# Patient Record
Sex: FEMALE | Race: WHITE | NOT HISPANIC OR LATINO | Employment: OTHER | URBAN - METROPOLITAN AREA
[De-identification: names, ages, dates, MRNs, and addresses within clinical notes are randomized per-mention and may not be internally consistent; named-entity substitution may affect disease eponyms.]

---

## 2020-06-09 ENCOUNTER — OFFICE VISIT (OUTPATIENT)
Dept: NEUROSURGERY | Facility: CLINIC | Age: 85
End: 2020-06-09

## 2020-06-09 DIAGNOSIS — M79.5 FOREIGN BODY (FB) IN SOFT TISSUE: Primary | ICD-10-CM

## 2020-06-09 PROBLEM — N30.01 ACUTE CYSTITIS WITH HEMATURIA: Status: ACTIVE | Noted: 2020-06-09

## 2020-06-09 PROBLEM — R41.0 CONFUSION: Status: ACTIVE | Noted: 2020-06-09

## 2020-06-09 PROCEDURE — 99442 PR PHYS/QHP TELEPHONE EVALUATION 11-20 MIN: CPT | Performed by: PHYSICIAN ASSISTANT

## 2020-06-09 RX ORDER — LANSOPRAZOLE 30 MG/1
CAPSULE, DELAYED RELEASE ORAL
COMMUNITY
Start: 2020-05-22 | End: 2021-06-03

## 2020-06-09 RX ORDER — HYDROCHLOROTHIAZIDE 12.5 MG/1
12.5 TABLET ORAL
COMMUNITY
Start: 2016-09-15 | End: 2021-06-03

## 2020-06-09 RX ORDER — AMLODIPINE BESYLATE 5 MG/1
TABLET ORAL
COMMUNITY
Start: 2020-05-21 | End: 2021-06-03

## 2020-06-09 RX ORDER — FERROUS SULFATE TAB EC 324 MG (65 MG FE EQUIVALENT) 324 (65 FE) MG
324 TABLET DELAYED RESPONSE ORAL
COMMUNITY
Start: 2016-11-02 | End: 2021-06-03

## 2020-06-09 RX ORDER — DONEPEZIL HYDROCHLORIDE 10 MG/1
TABLET, FILM COATED ORAL
COMMUNITY
Start: 2020-05-29 | End: 2021-06-03

## 2020-06-09 RX ORDER — NYSTATIN 100000 [USP'U]/G
POWDER TOPICAL
COMMUNITY
Start: 2020-04-18 | End: 2021-06-03

## 2020-06-09 RX ORDER — ATENOLOL 25 MG/1
TABLET ORAL
COMMUNITY
Start: 2020-05-20 | End: 2021-06-03

## 2020-06-09 RX ORDER — FUROSEMIDE 20 MG/1
TABLET ORAL
COMMUNITY
Start: 2020-06-02 | End: 2021-06-03

## 2020-06-09 RX ORDER — MIRTAZAPINE 15 MG/1
TABLET, FILM COATED ORAL
COMMUNITY
Start: 2020-05-30

## 2020-06-09 RX ORDER — LISINOPRIL 40 MG/1
TABLET ORAL
COMMUNITY
Start: 2020-06-04 | End: 2021-06-11

## 2020-06-09 RX ORDER — GABAPENTIN 100 MG/1
100 CAPSULE ORAL
COMMUNITY
End: 2021-06-03

## 2020-06-09 RX ORDER — ATORVASTATIN CALCIUM 10 MG/1
TABLET, FILM COATED ORAL
COMMUNITY
Start: 2020-05-30 | End: 2021-06-03

## 2020-06-09 RX ORDER — TRAZODONE HYDROCHLORIDE 50 MG/1
TABLET ORAL
COMMUNITY
Start: 2020-05-16

## 2020-06-09 RX ORDER — CLONAZEPAM 0.5 MG/1
0.5 TABLET ORAL
COMMUNITY
Start: 2016-09-15 | End: 2021-06-03

## 2020-06-09 RX ORDER — ASPIRIN 81 MG/1
81 TABLET ORAL
COMMUNITY
End: 2021-06-03

## 2020-06-09 RX ORDER — FAMOTIDINE 40 MG/1
TABLET, FILM COATED ORAL
COMMUNITY
Start: 2020-04-03 | End: 2021-06-03

## 2020-06-09 RX ORDER — CITALOPRAM 20 MG/1
TABLET ORAL
COMMUNITY
Start: 2020-06-04 | End: 2021-06-03

## 2020-06-09 RX ORDER — LEVOTHYROXINE SODIUM 0.12 MG/1
TABLET ORAL
COMMUNITY
Start: 2020-05-23 | End: 2021-06-03

## 2020-06-09 NOTE — PROGRESS NOTES
Office F/U Visit  Subjective   Patient ID: Ibrahima Pinto is a 86 y.o. female  [id number]     You have chosen to receive care through a telephone visit. Do you consent to use a telephone visit for your medical care today? Yes  I am talking today with the nursing provider and patient.    CC: Scab on scalp, can see screw    History of Present Illness this is a very pleasant 86-year-old female in [facility] who has developed a scab on her scalp and nursing has been able to view a small screw that has penetrated through the skin.  There are 2 other areas with discoloration from the underlying screw.  Apparently the patient is in good health according to the nursing staff, she is recently been treated for H. pylori otherwise no major medical issues.    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  Macario Oliva and [facility]  Subsequent  shunting for hydrocephalus, 10/3/2003,  Dr. Oliva.  Dementia-on Aricept.  HTN  Hypothyroid    No allergies      Current Outpatient Medications:   •  clonazePAM (KlonoPIN) 0.5 MG tablet, Take 0.5 mg by mouth., Disp: , Rfl:   •  ferrous sulfate 324 (65 Fe) MG tablet delayed-release EC tablet, Take 324 mg by mouth., Disp: , Rfl:   •  hydroCHLOROthiazide (HYDRODIURIL) 12.5 MG tablet, Take 12.5 mg by mouth., Disp: , Rfl:   •  amLODIPine (NORVASC) 5 MG tablet, , Disp: , Rfl:   •  aspirin 81 MG EC tablet, Take 81 mg by mouth., Disp: , Rfl:   •  atenolol (TENORMIN) 25 MG tablet, , Disp: , Rfl:   •  atorvastatin (LIPITOR) 10 MG tablet, , Disp: , Rfl:   •  citalopram (CeleXA) 20 MG tablet, , Disp: , Rfl:   •  donepezil (ARICEPT) 10 MG tablet, , Disp: , Rfl:   •  famotidine (PEPCID) 40 MG tablet, , Disp: , Rfl:   •  furosemide (LASIX) 20 MG tablet, , Disp: , Rfl:   •  gabapentin (NEURONTIN) 100 MG capsule, Take 100 mg by mouth., Disp: , Rfl:   •   lansoprazole (PREVACID) 30 MG capsule, , Disp: , Rfl:   •  levothyroxine (SYNTHROID, LEVOTHROID) 125 MCG tablet, , Disp: , Rfl:   •  lisinopril (PRINIVIL,ZESTRIL) 40 MG tablet, , Disp: , Rfl:   •  mirtazapine (REMERON) 15 MG tablet, , Disp: , Rfl:   •  NYSTATIN [id number] UNIT/GM powder, , Disp: , Rfl:   •  traZODone (DESYREL) 50 MG tablet, , Disp: , Rfl:     Social History     Tobacco Use   • Smoking status: Not on file   Substance Use Topics   • Alcohol use: Not on file   • Drug use: Not on file     I have reviewed and confirmed the accuracy of the ROS as documented by the MA/DEDRICKN/RN Yanelis Mancilla PA-C    PE:  Patient was on speaker phone with the nurse, she was alert and answered appropriately.    Neurologic Exam   Per nursing she is ambulatory, eating and taking po.    Independent Review of Radiographic Studies:   Head CT will be mailed to my office for review. Pictures of patient's scalp were sent to my phone which show an area of scabbing and another dark area with noticeable screw from the titanium plate.     Medical Decision Makin. Craniotomy fixation screw penetrating scalp. Had head CT which will be mailed to office for review. May need to have these surgically removed. I will call nursing home after review of CT.   2. Recent treatment for H. Pylori.    Patient's There is no height or weight on file to calculate BMI. BMI is within normal parameters. No follow-up required..  Ibrahima Pinto  has no tobacco history on file. she is a non-smoker.    Telephone visit 20 min.     KATHLEEN Bartholomew

## 2020-06-09 NOTE — PROGRESS NOTES
You have chosen to receive care through a telephone visit. Do you consent to use a telephone visit for your medical care today? YES      Office F/U Visit  Subjective   Patient ID: Ibrahima Pinto is a 86 y.o. female  [id number]    CC: Screw from craniotomy penetrating through scalp    History of Present Illness patient had craniotomy in 2003 now has a fixation screw that is backing out and penetrating through the scalp.    History reviewed. No pertinent past medical history.    No Known Allergies    Current Outpatient Medications:   •  clonazePAM (KlonoPIN) 0.5 MG tablet, Take 0.5 mg by mouth., Disp: , Rfl:   •  ferrous sulfate 324 (65 Fe) MG tablet delayed-release EC tablet, Take 324 mg by mouth., Disp: , Rfl:   •  hydroCHLOROthiazide (HYDRODIURIL) 12.5 MG tablet, Take 12.5 mg by mouth., Disp: , Rfl:   •  amLODIPine (NORVASC) 5 MG tablet, , Disp: , Rfl:   •  aspirin 81 MG EC tablet, Take 81 mg by mouth., Disp: , Rfl:   •  atenolol (TENORMIN) 25 MG tablet, , Disp: , Rfl:   •  atorvastatin (LIPITOR) 10 MG tablet, , Disp: , Rfl:   •  citalopram (CeleXA) 20 MG tablet, , Disp: , Rfl:   •  donepezil (ARICEPT) 10 MG tablet, , Disp: , Rfl:   •  famotidine (PEPCID) 40 MG tablet, , Disp: , Rfl:   •  furosemide (LASIX) 20 MG tablet, , Disp: , Rfl:   •  gabapentin (NEURONTIN) 100 MG capsule, Take 100 mg by mouth., Disp: , Rfl:   •  lansoprazole (PREVACID) 30 MG capsule, , Disp: , Rfl:   •  levothyroxine (SYNTHROID, LEVOTHROID) 125 MCG tablet, , Disp: , Rfl:   •  lisinopril (PRINIVIL,ZESTRIL) 40 MG tablet, , Disp: , Rfl:   •  mirtazapine (REMERON) 15 MG tablet, , Disp: , Rfl:   •  NYSTATIN [id number] UNIT/GM powder, , Disp: , Rfl:   •  traZODone (DESYREL) 50 MG tablet, , Disp: , Rfl:   Social History     Tobacco Use   • Smoking status: Never Smoker   • Smokeless tobacco: Never Used   Substance Use Topics   • Alcohol use: Never     Frequency: Never   • Drug use: Never     Review of Systems   Constitutional: Negative for activity  change, appetite change, chills, diaphoresis, fatigue, fever and unexpected weight change.   HENT: Negative for congestion, dental problem, drooling, ear discharge, ear pain, facial swelling, hearing loss, mouth sores, nosebleeds, postnasal drip, rhinorrhea, sinus pressure, sneezing, sore throat, tinnitus, trouble swallowing and voice change.    Eyes: Negative for photophobia, pain, discharge, redness, itching and visual disturbance.   Respiratory: Negative for apnea, cough, choking, chest tightness, shortness of breath, wheezing and stridor.    Cardiovascular: Negative for chest pain, palpitations and leg swelling.   Gastrointestinal: Negative for abdominal distention, abdominal pain, anal bleeding, blood in stool, constipation, diarrhea, nausea, rectal pain and vomiting.   Endocrine: Negative for cold intolerance, heat intolerance, polydipsia, polyphagia and polyuria.   Genitourinary: Negative for decreased urine volume, difficulty urinating, dysuria, enuresis, flank pain, frequency, genital sores, hematuria and urgency.   Musculoskeletal: Negative for arthralgias, back pain, gait problem, joint swelling, myalgias, neck pain and neck stiffness.   Skin: Negative for color change, pallor, rash and wound.   Allergic/Immunologic: Negative for environmental allergies, food allergies and immunocompromised state.   Neurological: Negative for dizziness, tremors, seizures, syncope, facial asymmetry, speech difficulty, weakness, light-headedness, numbness and headaches.   Hematological: Negative for adenopathy. Does not bruise/bleed easily.   Psychiatric/Behavioral: Negative for agitation, behavioral problems, confusion, decreased concentration, dysphoric mood, hallucinations, self-injury, sleep disturbance and suicidal ideas. The patient is not nervous/anxious and is not hyperactive.    All other systems reviewed and are negative.       Physical Exam  There were no vitals taken for this visit.    PE:    Neurologic Exam    The patient and nurse were on speaker phone and the patient is alert and conversant.    Independent Review of Radiographic Studies:   I have looked at pictures sent from the nursing staff.    Medical Decision Makin.  Foreign body-cranial fixation screw penetrating through the scalp.  There is a scab around the screw.  On picture there appears to be 2 other screws that have caused discoloration in the skin.  2.  A head CT scan has been obtained and her local area and the nursing staff will mail that to my office for review.  I did discuss with the nurse that probably ultimately we will need to remove the screw and we will try to make further arrangements after we have reviewed the CT of the head.    within normal parameters. No follow-up required..       Ibrahima Pinto  reports that she has never smoked. She has never used smokeless tobacco.      KATHLEEN Bartholomew    2020    We have reviewed the head CT, screw noted that has backed out, no evidence of bony infection.     We will make arrangements to have the patient come for planned OP surgical procedure to removed one or more cranial fixation screws.    Yanelis Mancilla PA-C

## 2020-06-11 ENCOUNTER — PREP FOR SURGERY (OUTPATIENT)
Dept: OTHER | Facility: HOSPITAL | Age: 85
End: 2020-06-11

## 2020-06-11 DIAGNOSIS — M79.5 FOREIGN BODY (FB) IN SOFT TISSUE: Primary | ICD-10-CM

## 2020-06-15 RX ORDER — SODIUM CHLORIDE 0.9 % (FLUSH) 0.9 %
10 SYRINGE (ML) INJECTION AS NEEDED
Status: CANCELLED | OUTPATIENT
Start: 2020-06-15

## 2020-06-15 RX ORDER — CEFAZOLIN SODIUM 2 G/100ML
2 INJECTION, SOLUTION INTRAVENOUS ONCE
Status: CANCELLED | OUTPATIENT
Start: 2020-06-15 | End: 2020-06-15

## 2020-06-15 RX ORDER — SODIUM CHLORIDE, SODIUM LACTATE, POTASSIUM CHLORIDE, CALCIUM CHLORIDE 600; 310; 30; 20 MG/100ML; MG/100ML; MG/100ML; MG/100ML
100 INJECTION, SOLUTION INTRAVENOUS CONTINUOUS
Status: CANCELLED | OUTPATIENT
Start: 2020-06-15

## 2020-06-15 RX ORDER — SODIUM CHLORIDE 0.9 % (FLUSH) 0.9 %
3 SYRINGE (ML) INJECTION EVERY 12 HOURS SCHEDULED
Status: CANCELLED | OUTPATIENT
Start: 2020-06-15

## 2020-06-15 NOTE — H&P
Patient ID: Ibrahima Pinto is a 86 y.o. female  [id number]      You have chosen to receive care through a telephone visit. Do you consent to use a telephone visit for your medical care today? Yes  I am talking today with the nursing provider and patient.     CC: Scab on scalp, can see screw     History of Present Illness this is a very pleasant 86-year-old female in [facility] who has developed a scab on her scalp and nursing has been able to view a small screw that has penetrated through the skin.  There are 2 other areas with discoloration from the underlying screw.  Apparently the patient is in good health according to the nursing staff, she is recently been treated for H. pylori otherwise no major medical issues.     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  Macario Oliva and [facility]oy  Subsequent  shunting for hydrocephalus, 10/3/2003,  Dr. Oliva.  Dementia-on Aricept.  HTN  Hypothyroid     No allergies        Current Outpatient Medications:   •  clonazePAM (KlonoPIN) 0.5 MG tablet, Take 0.5 mg by mouth., Disp: , Rfl:   •  ferrous sulfate 324 (65 Fe) MG tablet delayed-release EC tablet, Take 324 mg by mouth., Disp: , Rfl:   •  hydroCHLOROthiazide (HYDRODIURIL) 12.5 MG tablet, Take 12.5 mg by mouth., Disp: , Rfl:   •  amLODIPine (NORVASC) 5 MG tablet, , Disp: , Rfl:   •  aspirin 81 MG EC tablet, Take 81 mg by mouth., Disp: , Rfl:   •  atenolol (TENORMIN) 25 MG tablet, , Disp: , Rfl:   •  atorvastatin (LIPITOR) 10 MG tablet, , Disp: , Rfl:   •  citalopram (CeleXA) 20 MG tablet, , Disp: , Rfl:   •  donepezil (ARICEPT) 10 MG tablet, , Disp: , Rfl:   •  famotidine (PEPCID) 40 MG tablet, , Disp: , Rfl:   •  furosemide (LASIX) 20 MG tablet, , Disp: , Rfl:   •  gabapentin (NEURONTIN) 100 MG capsule, Take 100 mg by mouth., Disp: , Rfl:   •  lansoprazole (PREVACID) 30 MG  capsule, , Disp: , Rfl:   •  levothyroxine (SYNTHROID, LEVOTHROID) 125 MCG tablet, , Disp: , Rfl:   •  lisinopril (PRINIVIL,ZESTRIL) 40 MG tablet, , Disp: , Rfl:   •  mirtazapine (REMERON) 15 MG tablet, , Disp: , Rfl:   •  NYSTATIN [id number] UNIT/GM powder, , Disp: , Rfl:   •  traZODone (DESYREL) 50 MG tablet, , Disp: , Rfl:      Social History           Tobacco Use   • Smoking status: Not on file   Substance Use Topics   • Alcohol use: Not on file   • Drug use: Not on file      I have reviewed and confirmed the accuracy of the ROS as documented by the MA/LPN/RN Yanelis Mancilla PA-C     PE:  Patient was on speaker phone with the nurse, she was alert and answered appropriately.     Neurologic Exam   Per nursing she is ambulatory, eating and taking po.     Independent Review of Radiographic Studies:   Head CT will be mailed to my office for review. Pictures of patient's scalp were sent to my phone which show an area of scabbing and another dark area with noticeable screw from the titanium plate.      Medical Decision Makin. Craniotomy fixation screw penetrating scalp. Had head CT which will be mailed to office for review. May need to have these surgically removed. I will call nursing home after review of CT.   2. Recent treatment for H. Pylori.     Patient's There is no height or weight on file to calculate BMI. BMI is within normal parameters. No follow-up required..  Ibrahima Pinto  has no tobacco history on file. she is a non-smoker.     Nicolasa Mancilla, PAC

## 2020-06-16 DIAGNOSIS — M79.5 FOREIGN BODY (FB) IN SOFT TISSUE: Primary | ICD-10-CM

## 2020-06-17 DIAGNOSIS — Z00.6 EXAMINATION FOR NORMAL COMPARISON FOR CLINICAL RESEARCH: Primary | ICD-10-CM

## 2020-06-28 ENCOUNTER — APPOINTMENT (OUTPATIENT)
Dept: PREADMISSION TESTING | Facility: HOSPITAL | Age: 85
End: 2020-06-28

## 2020-06-30 ENCOUNTER — ANESTHESIA EVENT (OUTPATIENT)
Dept: PERIOP | Facility: HOSPITAL | Age: 85
End: 2020-06-30

## 2020-06-30 RX ORDER — SODIUM CHLORIDE 0.9 % (FLUSH) 0.9 %
10 SYRINGE (ML) INJECTION AS NEEDED
Status: CANCELLED | OUTPATIENT
Start: 2020-06-30

## 2020-06-30 RX ORDER — SODIUM CHLORIDE 0.9 % (FLUSH) 0.9 %
10 SYRINGE (ML) INJECTION EVERY 12 HOURS SCHEDULED
Status: CANCELLED | OUTPATIENT
Start: 2020-06-30

## 2020-06-30 RX ORDER — FAMOTIDINE 10 MG/ML
20 INJECTION, SOLUTION INTRAVENOUS ONCE
Status: CANCELLED | OUTPATIENT
Start: 2020-06-30 | End: 2020-06-30

## 2020-07-01 ENCOUNTER — HOSPITAL ENCOUNTER (OUTPATIENT)
Facility: HOSPITAL | Age: 85
Setting detail: HOSPITAL OUTPATIENT SURGERY
Discharge: HOME | End: 2020-07-01
Attending: NEUROLOGICAL SURGERY | Admitting: NEUROLOGICAL SURGERY

## 2020-07-01 ENCOUNTER — ANESTHESIA (OUTPATIENT)
Dept: PERIOP | Facility: HOSPITAL | Age: 85
End: 2020-07-01

## 2020-07-01 VITALS
TEMPERATURE: 97.2 F | DIASTOLIC BLOOD PRESSURE: 52 MMHG | HEIGHT: 64 IN | WEIGHT: 158 LBS | RESPIRATION RATE: 14 BRPM | SYSTOLIC BLOOD PRESSURE: 116 MMHG | OXYGEN SATURATION: 95 % | BODY MASS INDEX: 26.98 KG/M2 | HEART RATE: 59 BPM

## 2020-07-01 PROBLEM — T81.32XD: Status: ACTIVE | Noted: 2020-07-01

## 2020-07-01 LAB
ANION GAP SERPL CALCULATED.3IONS-SCNC: 13 MMOL/L (ref 5–15)
BASOPHILS # BLD AUTO: 0.08 10*3/MM3 (ref 0–0.2)
BASOPHILS NFR BLD AUTO: 0.7 % (ref 0–1.5)
BUN SERPL-MCNC: 49 MG/DL (ref 8–23)
BUN/CREAT SERPL: 22.1 (ref 7–25)
CALCIUM SPEC-SCNC: 9.5 MG/DL (ref 8.6–10.5)
CHLORIDE SERPL-SCNC: 108 MMOL/L (ref 98–107)
CO2 SERPL-SCNC: 24 MMOL/L (ref 22–29)
CREAT SERPL-MCNC: 2.22 MG/DL (ref 0.57–1)
DEPRECATED RDW RBC AUTO: 47.7 FL (ref 37–54)
EOSINOPHIL # BLD AUTO: 0.33 10*3/MM3 (ref 0–0.4)
EOSINOPHIL NFR BLD AUTO: 3 % (ref 0.3–6.2)
ERYTHROCYTE [DISTWIDTH] IN BLOOD BY AUTOMATED COUNT: 13.4 % (ref 12.3–15.4)
GFR SERPL CREATININE-BSD FRML MDRD: 21 ML/MIN/1.73
GLUCOSE SERPL-MCNC: 117 MG/DL (ref 65–99)
HCT VFR BLD AUTO: 38.3 % (ref 34–46.6)
HGB BLD-MCNC: 12 G/DL (ref 12–15.9)
IMM GRANULOCYTES # BLD AUTO: 0.06 10*3/MM3 (ref 0–0.05)
IMM GRANULOCYTES NFR BLD AUTO: 0.5 % (ref 0–0.5)
LYMPHOCYTES # BLD AUTO: 3.23 10*3/MM3 (ref 0.7–3.1)
LYMPHOCYTES NFR BLD AUTO: 29.3 % (ref 19.6–45.3)
MCH RBC QN AUTO: 30.2 PG (ref 26.6–33)
MCHC RBC AUTO-ENTMCNC: 31.3 G/DL (ref 31.5–35.7)
MCV RBC AUTO: 96.5 FL (ref 79–97)
MONOCYTES # BLD AUTO: 0.77 10*3/MM3 (ref 0.1–0.9)
MONOCYTES NFR BLD AUTO: 7 % (ref 5–12)
NEUTROPHILS NFR BLD AUTO: 59.5 % (ref 42.7–76)
NEUTROPHILS NFR BLD AUTO: 6.54 10*3/MM3 (ref 1.7–7)
NRBC BLD AUTO-RTO: 0 /100 WBC (ref 0–0.2)
PLATELET # BLD AUTO: 415 10*3/MM3 (ref 140–450)
PMV BLD AUTO: 10.1 FL (ref 6–12)
POTASSIUM SERPL-SCNC: 4.8 MMOL/L (ref 3.5–5.2)
RBC # BLD AUTO: 3.97 10*6/MM3 (ref 3.77–5.28)
SARS-COV-2 RNA RESP QL NAA+PROBE: NOT DETECTED
SODIUM SERPL-SCNC: 145 MMOL/L (ref 136–145)
WBC # BLD AUTO: 11.01 10*3/MM3 (ref 3.4–10.8)

## 2020-07-01 PROCEDURE — 85025 COMPLETE CBC W/AUTO DIFF WBC: CPT | Performed by: ANESTHESIOLOGY

## 2020-07-01 PROCEDURE — S0260 H&P FOR SURGERY: HCPCS | Performed by: PHYSICIAN ASSISTANT

## 2020-07-01 PROCEDURE — 25010000002 ONDANSETRON PER 1 MG: Performed by: NURSE ANESTHETIST, CERTIFIED REGISTERED

## 2020-07-01 PROCEDURE — 25010000003 CEFAZOLIN IN DEXTROSE 2-4 GM/100ML-% SOLUTION: Performed by: PHYSICIAN ASSISTANT

## 2020-07-01 PROCEDURE — 25010000002 FENTANYL CITRATE (PF) 100 MCG/2ML SOLUTION: Performed by: NURSE ANESTHETIST, CERTIFIED REGISTERED

## 2020-07-01 PROCEDURE — G0378 HOSPITAL OBSERVATION PER HR: HCPCS

## 2020-07-01 PROCEDURE — 25010000002 PROPOFOL 10 MG/ML EMULSION: Performed by: NURSE ANESTHETIST, CERTIFIED REGISTERED

## 2020-07-01 PROCEDURE — 93005 ELECTROCARDIOGRAM TRACING: CPT | Performed by: ANESTHESIOLOGY

## 2020-07-01 PROCEDURE — 80048 BASIC METABOLIC PNL TOTAL CA: CPT | Performed by: ANESTHESIOLOGY

## 2020-07-01 PROCEDURE — 20670 REMOVAL IMPLANT SUPERFICIAL: CPT | Performed by: NEUROLOGICAL SURGERY

## 2020-07-01 PROCEDURE — 25010000002 DEXAMETHASONE PER 1 MG: Performed by: NURSE ANESTHETIST, CERTIFIED REGISTERED

## 2020-07-01 PROCEDURE — 93010 ELECTROCARDIOGRAM REPORT: CPT | Performed by: INTERNAL MEDICINE

## 2020-07-01 PROCEDURE — C9803 HOPD COVID-19 SPEC COLLECT: HCPCS | Performed by: PHYSICIAN ASSISTANT

## 2020-07-01 PROCEDURE — 87635 SARS-COV-2 COVID-19 AMP PRB: CPT | Performed by: PHYSICIAN ASSISTANT

## 2020-07-01 RX ORDER — LIDOCAINE HYDROCHLORIDE 10 MG/ML
INJECTION, SOLUTION EPIDURAL; INFILTRATION; INTRACAUDAL; PERINEURAL AS NEEDED
Status: DISCONTINUED | OUTPATIENT
Start: 2020-07-01 | End: 2020-07-01 | Stop reason: SURG

## 2020-07-01 RX ORDER — ONDANSETRON 2 MG/ML
4 INJECTION INTRAMUSCULAR; INTRAVENOUS ONCE AS NEEDED
Status: DISCONTINUED | OUTPATIENT
Start: 2020-07-01 | End: 2020-07-01 | Stop reason: HOSPADM

## 2020-07-01 RX ORDER — PROMETHAZINE HYDROCHLORIDE 25 MG/1
25 SUPPOSITORY RECTAL ONCE AS NEEDED
Status: DISCONTINUED | OUTPATIENT
Start: 2020-07-01 | End: 2020-07-01 | Stop reason: HOSPADM

## 2020-07-01 RX ORDER — ONDANSETRON 2 MG/ML
INJECTION INTRAMUSCULAR; INTRAVENOUS AS NEEDED
Status: DISCONTINUED | OUTPATIENT
Start: 2020-07-01 | End: 2020-07-01 | Stop reason: SURG

## 2020-07-01 RX ORDER — PROMETHAZINE HYDROCHLORIDE 25 MG/1
25 TABLET ORAL ONCE AS NEEDED
Status: DISCONTINUED | OUTPATIENT
Start: 2020-07-01 | End: 2020-07-01 | Stop reason: HOSPADM

## 2020-07-01 RX ORDER — ACETAMINOPHEN 650 MG/1
650 SUPPOSITORY RECTAL ONCE AS NEEDED
Status: DISCONTINUED | OUTPATIENT
Start: 2020-07-01 | End: 2020-07-01 | Stop reason: HOSPADM

## 2020-07-01 RX ORDER — PROPOFOL 10 MG/ML
VIAL (ML) INTRAVENOUS AS NEEDED
Status: DISCONTINUED | OUTPATIENT
Start: 2020-07-01 | End: 2020-07-01 | Stop reason: SURG

## 2020-07-01 RX ORDER — IPRATROPIUM BROMIDE AND ALBUTEROL SULFATE 2.5; .5 MG/3ML; MG/3ML
3 SOLUTION RESPIRATORY (INHALATION) ONCE AS NEEDED
Status: DISCONTINUED | OUTPATIENT
Start: 2020-07-01 | End: 2020-07-01 | Stop reason: HOSPADM

## 2020-07-01 RX ORDER — DEXAMETHASONE SODIUM PHOSPHATE 4 MG/ML
INJECTION, SOLUTION INTRA-ARTICULAR; INTRALESIONAL; INTRAMUSCULAR; INTRAVENOUS; SOFT TISSUE AS NEEDED
Status: DISCONTINUED | OUTPATIENT
Start: 2020-07-01 | End: 2020-07-01 | Stop reason: SURG

## 2020-07-01 RX ORDER — LIDOCAINE HYDROCHLORIDE 10 MG/ML
0.5 INJECTION, SOLUTION EPIDURAL; INFILTRATION; INTRACAUDAL; PERINEURAL ONCE AS NEEDED
Status: COMPLETED | OUTPATIENT
Start: 2020-07-01 | End: 2020-07-01

## 2020-07-01 RX ORDER — FENTANYL CITRATE 50 UG/ML
50 INJECTION, SOLUTION INTRAMUSCULAR; INTRAVENOUS
Status: DISCONTINUED | OUTPATIENT
Start: 2020-07-01 | End: 2020-07-01 | Stop reason: HOSPADM

## 2020-07-01 RX ORDER — SODIUM CHLORIDE, SODIUM LACTATE, POTASSIUM CHLORIDE, CALCIUM CHLORIDE 600; 310; 30; 20 MG/100ML; MG/100ML; MG/100ML; MG/100ML
9 INJECTION, SOLUTION INTRAVENOUS CONTINUOUS
Status: DISCONTINUED | OUTPATIENT
Start: 2020-07-01 | End: 2020-07-01 | Stop reason: HOSPADM

## 2020-07-01 RX ORDER — CEFAZOLIN SODIUM 2 G/100ML
2 INJECTION, SOLUTION INTRAVENOUS ONCE
Status: COMPLETED | OUTPATIENT
Start: 2020-07-01 | End: 2020-07-01

## 2020-07-01 RX ORDER — MAGNESIUM HYDROXIDE 1200 MG/15ML
LIQUID ORAL AS NEEDED
Status: DISCONTINUED | OUTPATIENT
Start: 2020-07-01 | End: 2020-07-01 | Stop reason: HOSPADM

## 2020-07-01 RX ORDER — ACETAMINOPHEN 325 MG/1
650 TABLET ORAL ONCE AS NEEDED
Status: DISCONTINUED | OUTPATIENT
Start: 2020-07-01 | End: 2020-07-01 | Stop reason: HOSPADM

## 2020-07-01 RX ORDER — MEPERIDINE HYDROCHLORIDE 25 MG/ML
12.5 INJECTION INTRAMUSCULAR; INTRAVENOUS; SUBCUTANEOUS
Status: DISCONTINUED | OUTPATIENT
Start: 2020-07-01 | End: 2020-07-01 | Stop reason: HOSPADM

## 2020-07-01 RX ORDER — FAMOTIDINE 20 MG/1
20 TABLET, FILM COATED ORAL ONCE
Status: COMPLETED | OUTPATIENT
Start: 2020-07-01 | End: 2020-07-01

## 2020-07-01 RX ORDER — PROMETHAZINE HYDROCHLORIDE 25 MG/ML
6.25 INJECTION, SOLUTION INTRAMUSCULAR; INTRAVENOUS ONCE AS NEEDED
Status: DISCONTINUED | OUTPATIENT
Start: 2020-07-01 | End: 2020-07-01 | Stop reason: HOSPADM

## 2020-07-01 RX ORDER — FENTANYL CITRATE 50 UG/ML
INJECTION, SOLUTION INTRAMUSCULAR; INTRAVENOUS AS NEEDED
Status: DISCONTINUED | OUTPATIENT
Start: 2020-07-01 | End: 2020-07-01 | Stop reason: SURG

## 2020-07-01 RX ORDER — VECURONIUM BROMIDE 1 MG/ML
INJECTION, POWDER, LYOPHILIZED, FOR SOLUTION INTRAVENOUS AS NEEDED
Status: DISCONTINUED | OUTPATIENT
Start: 2020-07-01 | End: 2020-07-01 | Stop reason: SURG

## 2020-07-01 RX ADMIN — SUGAMMADEX 200 MG: 100 INJECTION, SOLUTION INTRAVENOUS at 11:09

## 2020-07-01 RX ADMIN — LIDOCAINE HYDROCHLORIDE 50 MG: 10 INJECTION, SOLUTION EPIDURAL; INFILTRATION; INTRACAUDAL; PERINEURAL at 10:47

## 2020-07-01 RX ADMIN — PROPOFOL 80 MG: 10 INJECTION, EMULSION INTRAVENOUS at 10:47

## 2020-07-01 RX ADMIN — LIDOCAINE HYDROCHLORIDE 0.2 ML: 10 INJECTION, SOLUTION EPIDURAL; INFILTRATION; INTRACAUDAL; PERINEURAL at 09:55

## 2020-07-01 RX ADMIN — FENTANYL CITRATE 50 MCG: 50 INJECTION, SOLUTION INTRAMUSCULAR; INTRAVENOUS at 10:41

## 2020-07-01 RX ADMIN — ONDANSETRON 4 MG: 2 INJECTION INTRAMUSCULAR; INTRAVENOUS at 11:02

## 2020-07-01 RX ADMIN — SODIUM CHLORIDE, POTASSIUM CHLORIDE, SODIUM LACTATE AND CALCIUM CHLORIDE 9 ML/HR: 600; 310; 30; 20 INJECTION, SOLUTION INTRAVENOUS at 09:55

## 2020-07-01 RX ADMIN — FAMOTIDINE 20 MG: 20 TABLET, FILM COATED ORAL at 10:00

## 2020-07-01 RX ADMIN — DEXAMETHASONE SODIUM PHOSPHATE 4 MG: 4 INJECTION, SOLUTION INTRAMUSCULAR; INTRAVENOUS at 10:55

## 2020-07-01 RX ADMIN — CEFAZOLIN SODIUM 2 G: 2 INJECTION, SOLUTION INTRAVENOUS at 10:41

## 2020-07-01 RX ADMIN — VECURONIUM BROMIDE 4 MG: 1 INJECTION, POWDER, LYOPHILIZED, FOR SOLUTION INTRAVENOUS at 10:47

## 2020-07-01 RX ADMIN — FENTANYL CITRATE 50 MCG: 50 INJECTION, SOLUTION INTRAMUSCULAR; INTRAVENOUS at 10:47

## 2020-07-01 NOTE — ANESTHESIA PROCEDURE NOTES
Airway  Urgency: elective    Date/Time: 7/1/2020 10:47 AM  End Time:7/1/2020 10:50 AM  Airway not difficult    General Information and Staff    Patient location during procedure: OR  CRNA: Edgardo Levy CRNA    Indications and Patient Condition  Indications for airway management: airway protection    Preoxygenated: yes  MILS not maintained throughout  Mask difficulty assessment: 1 - vent by mask    Final Airway Details  Final airway type: endotracheal airway      Successful airway: ETT  Cuffed: yes   Successful intubation technique: direct laryngoscopy  Endotracheal tube insertion site: oral  Blade: Adan  Blade size: 3  ETT size (mm): 7.0  Cormack-Lehane Classification: grade I - full view of glottis  Placement verified by: chest auscultation and capnometry   Measured from: lips  ETT/EBT  to lips (cm): 21  Number of attempts at approach: 1    Additional Comments  Negative epigastric sounds, Breath sound equal bilaterally with symmetric chest rise and fall

## 2020-07-01 NOTE — ANESTHESIA PREPROCEDURE EVALUATION
Anesthesia Evaluation                  Airway   Mallampati: I  TM distance: >3 FB  Neck ROM: full  No difficulty expected  Dental - normal exam     Pulmonary - normal exam   Cardiovascular - normal exam    (+) hypertension, hyperlipidemia,       Neuro/Psych  (+) psychiatric history Anxiety and Depression, dementia,     GI/Hepatic/Renal/Endo    (+)  GERD,  renal disease ARF, thyroid problem hypothyroidism    Musculoskeletal     Abdominal  - normal exam    Bowel sounds: normal.   Substance History      OB/GYN          Other                        Anesthesia Plan    ASA 3     general     intravenous induction     Anesthetic plan, all risks, benefits, and alternatives have been provided, discussed and informed consent has been obtained with: patient.    Plan discussed with CRNA.

## 2020-07-01 NOTE — H&P
"Ibrahima LINDA Pinto    Patient Care Team:  Cezar Mcgovern MD as PCP - General  Cezar Mcgovern MD  [id number]      Chief complaint:  Scab on scalp    HPI:  Patient is a 86 y.o. female presents with skin erosion from prior craniotomy cranial fixation device screw.    Past Medical History:   Diagnosis Date   • Alzheimer's dementia (CMS/HCC)    • Anxiety    • Arthritis     OA   • Depression    • Disease of thyroid gland    • Dysphagia    • GERD (gastroesophageal reflux disease)    • Hyperlipidemia    • Hypertension    • Incontinence of urine    • Retinopathy     bilateral hypertensive retinopathy       No Known Allergies      Current Facility-Administered Medications:   •  ceFAZolin in dextrose (ANCEF) IVPB solution 2 g, 2 g, Intravenous, Once, Yanelis Mancilla PA-C  •  lactated ringers infusion, 9 mL/hr, Intravenous, Continuous, Babak Morgan Jr., MD, Last Rate: 9 mL/hr at 07/01/20 0955, 9 mL/hr at 07/01/20 0955    Social History     Socioeconomic History   • Marital status:      Spouse name: Not on file   • Number of children: Not on file   • Years of education: Not on file   • Highest education level: Not on file   Tobacco Use   • Smoking status: Never Smoker   • Smokeless tobacco: Never Used   Substance and Sexual Activity   • Alcohol use: Never     Frequency: Never   • Drug use: Never   • Sexual activity: Defer       History reviewed. No pertinent family history.    Review of Systems  Neurological ROS: negative    Vital Signs  Temp:  [97.2 °F (36.2 °C)] 97.2 °F (36.2 °C)  Heart Rate:  [61] 61  Resp:  [18] 18  BP: (162)/(77) 162/77  Body mass index is 27.12 kg/m².  No intake or output data in the 24 hours ending 07/01/20 1033  Flowsheet Rows      First Filed Value   Admission Height  162.6 cm (64\") Documented at 07/01/2020 1014   Admission Weight  71.7 kg (158 lb) Documented at 07/01/2020 1014           Physical Exam:    General Appearance:    Alert, cooperative, in no acute distress   Head:    " Normocephalic, obvious protrusion of 3 cranial fixation screws   Eyes:            Lids and lashes normal, conjunctivae and sclerae normal, no   icterus, no pallor, corneas clear, PERRLA   Ears:    Ears appear intact with no abnormalities noted   Throat:   No oral lesions, no thrush, oral mucosa mildly dry   Neck:   No adenopathy, supple, trachea midline, no thyromegaly, no     carotid bruit, no JVD   Back:     n/a   Lungs:     Clear to auscultation,respirations regular, even and                   unlabored    Heart:    Regular rhythm and normal rate, normal S1 and S2, no            murmur, no gallop, no rub, no click   Breast Exam:    Deferred   Abdomen:     Normal bowel sounds, no masses, no organomegaly, soft        non-tender, non-distended, no guarding, no rebound                 tenderness   Genitalia:    Deferred   Extremities:   Moves all extremities well, no edema, no cyanosis, no              redness   Pulses:   Pulses palpable and equal bilaterally   Skin:   No bleeding, bruising or rash   Lymph nodes:   No palpable adenopathy   Neurologic:   Cranial nerves 2 - 12 grossly intact, sensation intact       Results Review:  WBC   Date Value Ref Range Status   07/01/2020 11.01 (H) 3.40 - 10.80 10*3/mm3 Final     RBC   Date Value Ref Range Status   07/01/2020 3.97 3.77 - 5.28 10*6/mm3 Final     Hemoglobin   Date Value Ref Range Status   07/01/2020 12.0 12.0 - 15.9 g/dL Final     Hematocrit   Date Value Ref Range Status   07/01/2020 38.3 34.0 - 46.6 % Final     MCV   Date Value Ref Range Status   07/01/2020 96.5 79.0 - 97.0 fL Final     MCH   Date Value Ref Range Status   07/01/2020 30.2 26.6 - 33.0 pg Final     MCHC   Date Value Ref Range Status   07/01/2020 31.3 (L) 31.5 - 35.7 g/dL Final     RDW   Date Value Ref Range Status   07/01/2020 13.4 12.3 - 15.4 % Final     RDW-SD   Date Value Ref Range Status   07/01/2020 47.7 37.0 - 54.0 fl Final     MPV   Date Value Ref Range Status   07/01/2020 10.1 6.0 - 12.0 fL  Final     Platelets   Date Value Ref Range Status   07/01/2020 415 140 - 450 10*3/mm3 Final     Neutrophil %   Date Value Ref Range Status   07/01/2020 59.5 42.7 - 76.0 % Final     Lymphocyte %   Date Value Ref Range Status   07/01/2020 29.3 19.6 - 45.3 % Final     Monocyte %   Date Value Ref Range Status   07/01/2020 7.0 5.0 - 12.0 % Final     Eosinophil %   Date Value Ref Range Status   07/01/2020 3.0 0.3 - 6.2 % Final     Basophil %   Date Value Ref Range Status   07/01/2020 0.7 0.0 - 1.5 % Final     Immature Grans %   Date Value Ref Range Status   07/01/2020 0.5 0.0 - 0.5 % Final     Neutrophils, Absolute   Date Value Ref Range Status   07/01/2020 6.54 1.70 - 7.00 10*3/mm3 Final     Lymphocytes, Absolute   Date Value Ref Range Status   07/01/2020 3.23 (H) 0.70 - 3.10 10*3/mm3 Final     Monocytes, Absolute   Date Value Ref Range Status   07/01/2020 0.77 0.10 - 0.90 10*3/mm3 Final     Eosinophils, Absolute   Date Value Ref Range Status   07/01/2020 0.33 0.00 - 0.40 10*3/mm3 Final     Basophils, Absolute   Date Value Ref Range Status   07/01/2020 0.08 0.00 - 0.20 10*3/mm3 Final     Immature Grans, Absolute   Date Value Ref Range Status   07/01/2020 0.06 (H) 0.00 - 0.05 10*3/mm3 Final     nRBC   Date Value Ref Range Status   07/01/2020 0.0 0.0 - 0.2 /100 WBC Final         Patient Active Problem List   Diagnosis Code   • Acute cystitis with hematuria N30.01   • Acute renal failure (ARF) (CMS/HCC) N17.9   • Altered mental status R41.82   • Calculus of gallbladder without cholecystitis K80.20   • Confusion R41.0   • Essential hypertension I10   • Flank pain R10.9   • Hypothyroidism E03.9   • Iron deficiency anemia D50.9   • Renal insufficiency N28.9   • Vitamin D deficiency E55.9   • Foreign body (FB) in soft tissue M79.5         Assessment/Plan   1. Foreign body- cranial fixation screws- eroding thru the scalp. Here for removal.   2. Renal dz. Creatine 2.2  3. H/O aneurysm clipping, 2003  4. Dementia  5. HTN  6.  Hypothyroid  7. GERD    Plan: removal of cranial fixation screws    Yanelis Mancilla PA-C  07/01/20  10:33

## 2020-07-01 NOTE — OP NOTE
Preoperative diagnosis: Dehiscence of scalp incision.    Postoperative diagnosis: Same.    Operation performed: Removal of fixation device.    Surgeon:  MD Rebeca Curiel PA-C    Indication:  This 86-year-old female who 17 years ago had an aneurysm surgically treated and has done well.  She noted that she had a metallic foreign body at the most anterior aspect of her old incision which had penetrated the skin.  She was admitted at this time for removal of the fixation device.    Operative report:  Subsequent to induction of general anesthesia the scalp was prepped and draped in usual manner.    The fixation device was removed.  Bacitracin was placed over the opening and a sterile dressing was applied.    Taken to recovery in satisfactory condition.

## 2020-07-01 NOTE — ANESTHESIA POSTPROCEDURE EVALUATION
Patient: Ibrahima Pinto    Procedure Summary     Date:  07/01/20 Room / Location:   [facility] /  [facility]    Anesthesia Start:  1041 Anesthesia Stop:  1119    Procedure:  CRANIOTOMY, REMOVING SCREWS (N/A Head) Diagnosis:       Foreign body (FB) in soft tissue      (Foreign body (FB) in soft tissue [M79.5])    Surgeon:  Scottie Lyons MD Provider:  Babak Morgan Jr., MD    Anesthesia Type:  general ASA Status:  3          Anesthesia Type: general    Vitals  No vitals data found for the desired time range.          Post Anesthesia Care and Evaluation    Patient location during evaluation: PACU  Patient participation: complete - patient participated  Level of consciousness: awake and alert  Pain score: 0  Pain management: adequate  Airway patency: patent  Anesthetic complications: No anesthetic complications  PONV Status: none  Cardiovascular status: hemodynamically stable and acceptable  Respiratory status: nonlabored ventilation, acceptable and nasal cannula  Hydration status: acceptable

## 2020-07-02 NOTE — DISCHARGE SUMMARY
Date of Discharge:  7/2/2020    Cezar Mcgovern MD    Discharge Diagnosis: Wound dehiscence  Procedures Performed  Procedure(s):  CRANIOTOMY, REMOVING SCREWS       Summary of Hospitalization: 86-year-old female had aneurysm clipping 17 years ago.  Scalp has gotten thin and a fixation device eroded to the scalp.  She was admitted to hospital as an outpatient for removal of the screw.    This was successfully performed without sequelae and she returned to the nursing home.      Condition on Discharge: Stable    Discharge Disposition  Home or Self Care    Discharge Medications     Discharge Medications      Continue These Medications      Instructions Start Date   amLODIPine 5 MG tablet  Commonly known as:  NORVASC   No dose, route, or frequency recorded.      aspirin 81 MG EC tablet   81 mg, Oral      atenolol 25 MG tablet  Commonly known as:  TENORMIN   No dose, route, or frequency recorded.      atorvastatin 10 MG tablet  Commonly known as:  LIPITOR   No dose, route, or frequency recorded.      citalopram 20 MG tablet  Commonly known as:  CeleXA   No dose, route, or frequency recorded.      clonazePAM 0.5 MG tablet  Commonly known as:  KlonoPIN   0.5 mg, Oral      donepezil 10 MG tablet  Commonly known as:  ARICEPT   No dose, route, or frequency recorded.      famotidine 40 MG tablet  Commonly known as:  PEPCID   No dose, route, or frequency recorded.      ferrous sulfate 324 (65 Fe) MG tablet delayed-release EC tablet   324 mg, Oral      furosemide 20 MG tablet  Commonly known as:  LASIX   No dose, route, or frequency recorded.      gabapentin 100 MG capsule  Commonly known as:  NEURONTIN   100 mg, Oral      hydroCHLOROthiazide 12.5 MG tablet  Commonly known as:  HYDRODIURIL   12.5 mg, Oral      lansoprazole 30 MG capsule  Commonly known as:  PREVACID   No dose, route, or frequency recorded.      levothyroxine 125 MCG tablet  Commonly known as:  SYNTHROID, LEVOTHROID   No dose, route, or frequency recorded.       lisinopril 40 MG tablet  Commonly known as:  PRINIVIL,ZESTRIL   No dose, route, or frequency recorded.      mirtazapine 15 MG tablet  Commonly known as:  REMERON   No dose, route, or frequency recorded.      nystatin 675276 UNIT/GM powder  Generic drug:  nystatin   No dose, route, or frequency recorded.      traZODone 50 MG tablet  Commonly known as:  DESYREL   No dose, route, or frequency recorded.               Follow-up Appointments  No future appointments.  Additional Instructions for the Follow-ups that You Need to Schedule     Discharge Follow-up with Specified Provider: neurosurgery; 1 Week   As directed      To:  neurosurgery    Follow Up:  1 Week    Follow Up Details:  call KATHLEEN Lujan MD  07/02/20  15:33

## 2020-07-16 ENCOUNTER — TELEPHONE (OUTPATIENT)
Dept: NEUROSURGERY | Facility: CLINIC | Age: 85
End: 2020-07-16

## 2020-07-16 NOTE — TELEPHONE ENCOUNTER
Provider:  Eloy  Caller: Kamryn  Time of call:   10:39  Phone #:  [phone]  Surgery:  Crani to remove screws  Surgery Date:  7/1/20  Last visit:   6/9/20  Next visit: Not scheduled    FABIANA:         Reason for call:     Nursing facility called today to set up a late appointment for the patient.  Reports that the patient is doing very well after procedure and that sutures have been removed.  Please advise if this should stay a telephone visit for the patient or if in office is required.

## 2020-07-17 NOTE — TELEPHONE ENCOUNTER
Kamryn was made aware that as long as the patient is doing well that we don't need to see her in the office.  If there are any concerns in the future she will make sure that the patient calls to schedule an appointment.

## 2021-05-03 ENCOUNTER — TELEPHONE (OUTPATIENT)
Dept: NEUROSURGERY | Facility: CLINIC | Age: 86
End: 2021-05-03

## 2021-06-03 ENCOUNTER — OFFICE VISIT (OUTPATIENT)
Dept: NEUROSURGERY | Facility: CLINIC | Age: 86
End: 2021-06-03

## 2021-06-03 VITALS
DIASTOLIC BLOOD PRESSURE: 78 MMHG | HEIGHT: 64 IN | BODY MASS INDEX: 27.12 KG/M2 | SYSTOLIC BLOOD PRESSURE: 128 MMHG | TEMPERATURE: 96.9 F

## 2021-06-03 DIAGNOSIS — T81.30XA WOUND DEHISCENCE: Primary | ICD-10-CM

## 2021-06-03 DIAGNOSIS — T81.32XD: ICD-10-CM

## 2021-06-03 DIAGNOSIS — M79.5 FOREIGN BODY (FB) IN SOFT TISSUE: ICD-10-CM

## 2021-06-03 PROCEDURE — 99213 OFFICE O/P EST LOW 20 MIN: CPT | Performed by: PHYSICIAN ASSISTANT

## 2021-06-03 RX ORDER — OMEPRAZOLE 20 MG/1
20 CAPSULE, DELAYED RELEASE ORAL DAILY
COMMUNITY

## 2021-06-03 RX ORDER — ALBUTEROL SULFATE 1.25 MG/3ML
SOLUTION RESPIRATORY (INHALATION)
COMMUNITY
Start: 2021-04-01

## 2021-06-03 RX ORDER — NITROFURANTOIN MACROCRYSTALS 100 MG/1
CAPSULE ORAL
COMMUNITY
Start: 2021-06-01 | End: 2021-06-03 | Stop reason: SDUPTHER

## 2021-06-03 RX ORDER — FUROSEMIDE 20 MG/1
20 TABLET ORAL DAILY
COMMUNITY

## 2021-06-03 RX ORDER — ATORVASTATIN CALCIUM 10 MG/1
10 TABLET, FILM COATED ORAL DAILY
COMMUNITY

## 2021-06-03 RX ORDER — LEVOTHYROXINE SODIUM 0.2 MG/1
TABLET ORAL
COMMUNITY
Start: 2021-05-19 | End: 2021-06-03

## 2021-06-03 RX ORDER — AMLODIPINE BESYLATE 5 MG/1
5 TABLET ORAL DAILY
COMMUNITY

## 2021-06-03 RX ORDER — ATENOLOL 25 MG/1
25 TABLET ORAL DAILY
COMMUNITY

## 2021-06-03 RX ORDER — ONDANSETRON 4 MG/1
TABLET, FILM COATED ORAL
COMMUNITY
Start: 2021-02-25

## 2021-06-03 RX ORDER — LACTULOSE 10 G/15ML
SOLUTION ORAL
COMMUNITY
Start: 2021-04-22

## 2021-06-03 RX ORDER — L. ACIDOPHILUS/L.BULGARICUS 1MM CELL
TABLET ORAL 3 TIMES DAILY
COMMUNITY

## 2021-06-03 RX ORDER — NITROFURANTOIN 25; 75 MG/1; MG/1
CAPSULE ORAL
COMMUNITY
Start: 2021-05-06

## 2021-06-03 RX ORDER — FUROSEMIDE 40 MG/1
40 TABLET ORAL DAILY
COMMUNITY

## 2021-06-03 RX ORDER — ASPIRIN 81 MG/1
81 TABLET, CHEWABLE ORAL DAILY
COMMUNITY

## 2021-06-03 NOTE — PROGRESS NOTES
"Ibrahima Pinto   9/12/1933   [id number]       06/03/2021     Chief Complaint   Patient presents with   • \"screw backing out of crani site\"     frontal region        HPI   This is a 87 year old white female known to my practice for history of crani in 2003 for shunt placement.  Patient returned to clinic in 2020 at which time she was taken back to surgery to remove a screw which was \"backing out\".  Patient returns to the clinic today c/o another \"screw backing out of her head\" in the frontal region.  July 2020 we did take out two screws that had dehisced through the scalp.  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  She subsequently developed hydrocephalus and had  shunt.    Chronic Illnesses:  Dementia  Osteoarthritis  Past Medical History:  No date: Alzheimer's dementia (CMS/HCC)  No date: Anxiety  No date: Arthritis      Comment:  OA  No date: Depression  No date: Disease of thyroid gland  No date: Dysphagia  No date: GERD (gastroesophageal reflux disease)  No date: Hyperlipidemia  No date: Hypertension  No date: Incontinence of urine  No date: Retinopathy      Comment:  bilateral hypertensive retinopathy     Past Surgical History:   Procedure Laterality Date   • CRANIOTOMY  08/18/2003    Dr. Derek Oliva   • CRANIOTOMY FOR ANEURYSM      2010 Dr. Oliva   • CRANIOTOMY FOR TUMOR N/A 7/1/2020    Procedure: CRANIOTOMY, REMOVING SCREWS;  Surgeon: Scottie Lyons MD;  Location: [facility];  Service: Neurosurgery;  Laterality: N/A;   • VENTRICULOPERITONEAL SHUNT Right 10/03/2003    Right  shunt- Dr. Derek Oliva        No Known Allergies       Current Outpatient Medications:   •  acidophilus (FLORANEX) tablet tablet, Take  by mouth 3 (Three) Times a Day., Disp: , Rfl:   •  albuterol (ACCUNEB) 1.25 MG/3ML nebulizer solution, , Disp: , Rfl:   •  amLODIPine (NORVASC) 5 MG tablet, Take 5 mg " "by mouth Daily., Disp: , Rfl:   •  aspirin 81 MG chewable tablet, Chew 81 mg Daily., Disp: , Rfl:   •  atenolol (TENORMIN) 25 MG tablet, Take 25 mg by mouth Daily., Disp: , Rfl:   •  atorvastatin (LIPITOR) 10 MG tablet, Take 10 mg by mouth Daily., Disp: , Rfl:   •  furosemide (LASIX) 20 MG tablet, Take 20 mg by mouth Daily., Disp: , Rfl:   •  furosemide (LASIX) 40 MG tablet, Take 40 mg by mouth Daily., Disp: , Rfl:   •  lactulose (CHRONULAC) 10 GM/15ML solution, , Disp: , Rfl:   •  mirtazapine (REMERON) 15 MG tablet, , Disp: , Rfl:   •  nitrofurantoin, macrocrystal-monohydrate, (MACROBID) 100 MG capsule, , Disp: , Rfl:   •  omeprazole (priLOSEC) 20 MG capsule, Take 20 mg by mouth Daily., Disp: , Rfl:   •  ondansetron (ZOFRAN) 4 MG tablet, , Disp: , Rfl:   •  sertraline (ZOLOFT) 50 MG tablet, , Disp: , Rfl:   •  traZODone (DESYREL) 50 MG tablet, , Disp: , Rfl:   •  lisinopril (PRINIVIL,ZESTRIL) 40 MG tablet, , Disp: , Rfl:      Social History     Socioeconomic History   • Marital status:      Spouse name: Not on file   • Number of children: Not on file   • Years of education: Not on file   • Highest education level: Not on file   Tobacco Use   • Smoking status: Never Smoker   • Smokeless tobacco: Never Used   Substance and Sexual Activity   • Alcohol use: Never   • Drug use: Never   • Sexual activity: Defer        family history is not on file.     Social History    Tobacco Use      Smoking status: Never Smoker      Smokeless tobacco: Never Used       Body mass index is 27.12 kg/m².   Patient's Body mass index is 27.12 kg/m². indicating that she is obese (BMI >30). Obesity-related health conditions include the following: none. Obesity is unchanged. BMI is is above average; BMI management plan is completed.      /78 (BP Location: Left arm, Patient Position: Sitting, Cuff Size: Adult)   Temp 96.9 °F (36.1 °C) (Infrared)   Ht 162.6 cm (64\")   BMI 27.12 kg/m²    Physical Examination:  HEENT-there is a " small portion of a cranial fixation device that has dehisced through the skin.  There is no signs of infection.  Lungs-No wheezing or SOB      Neurologic Exam   Patient is awake and alert, very pleasant.  She is conversant.  She does not have headache, she does not have pain associated with her scalp.    Assessment and Plan:  1.  Dehiscence of scalp incision with cranial fixation device.  No evidence of infection.  2.  I have reviewed the picture of the patient wound with Dr. Roman and we have discussed options.  Conservative treatment would be in the patient's best interest if at all possible without any signs of infection this could be obtained.  If the dehiscence area were to get infected for surgical intervention she would have to be evaluated at the [facility].  Plastic surgery would have to be involved because of her old scarring it would not be possible to primarily close her incision.  We would like to avoid this process if at all possible and if the family is in agreement.    Yanelis Mancilla, PAC      PCP:  Cezar Mcgovern MD

## 2021-06-11 PROBLEM — T81.30XA WOUND DEHISCENCE: Status: ACTIVE | Noted: 2021-06-11

## 2021-06-16 ENCOUNTER — DOCUMENTATION (OUTPATIENT)
Dept: NEUROSURGERY | Facility: CLINIC | Age: 86
End: 2021-06-16

## 2021-06-16 NOTE — PROGRESS NOTES
At the patient's last office visit in [place] I evaluated her scalp with the protruding foreign object, I took pictures and I have showed those to Dr. Roman, one of the neurosurgeons.  The area does not look infected.  Based upon the chronicity of this area that does not look infected we would leave this cranial fixation device as is.  If she were to show any signs of infection this would take a multiteam approach to do this surgery because she would have to be seen by plastic surgery and have staged surgeries completed to include a muscle flap.  If she were to require surgery it would need to be done at the [facility].  Please call me with any questions or concerns [phone].    Nicolasa Mancilla PA-C

## (undated) DEVICE — DISPOSABLE TUBING SET AND EXTENDER FILTER TUBING

## (undated) DEVICE — ACCY PA700 LUBRICANT DIFFUSER MR7 4 PACK: Brand: MIDAS REX

## (undated) DEVICE — TOWEL,OR,DSP,ST,BLUE,STD,4/PK,20PK/CS: Brand: MEDLINE

## (undated) DEVICE — CLTH CLENS READYCLEANSE PERI CARE PK/5

## (undated) DEVICE — 3M™ STERI-DRAPE™ INSTRUMENT POUCH 1018: Brand: STERI-DRAPE™

## (undated) DEVICE — PERFORATOR CRANI 14MM 11MM

## (undated) DEVICE — Device

## (undated) DEVICE — GOWN,PREVENTION PLUS,XXLARGE,STERILE: Brand: MEDLINE

## (undated) DEVICE — TOOL 8TA11 LEGEND 8CM 1.1MM TA: Brand: MIDAS REX ™

## (undated) DEVICE — GLV SURG PREMIERPRO MIC LTX PF SZ7 BRN

## (undated) DEVICE — 2963 MEDIPORE SOFT CLOTH TAPE 3 IN X 10 YD 12 RLS/CS: Brand: 3M™ MEDIPORE™

## (undated) DEVICE — GLV SURG PREMIERPRO MIC LTX PF SZ7.5 BRN

## (undated) DEVICE — STRAIGHT TIP, UNIVERSAL

## (undated) DEVICE — TOOL F2/8TA23 LEGEND 8CM 2.3MM TAPER: Brand: MIDAS REX™

## (undated) DEVICE — ANTIBACTERIAL UNDYED BRAIDED (POLYGLACTIN 910), SYNTHETIC ABSORBABLE SUTURE: Brand: COATED VICRYL

## (undated) DEVICE — GLV SURG PREMIERPRO MIC LTX PF SZ6.5 BRN

## (undated) DEVICE — APPL DURAPREP IODOPHOR APL 26ML

## (undated) DEVICE — DRV BATRY MATRIXPRO STRL

## (undated) DEVICE — SUT ETHLN 3/0 FS1 30IN 669H

## (undated) DEVICE — NEURO SPONGES: Brand: DEROYAL

## (undated) DEVICE — PK CRANI 10

## (undated) DEVICE — SPNG GZ WOVN 4X4IN 12PLY 10/BX STRL